# Patient Record
Sex: FEMALE | Race: WHITE | Employment: FULL TIME | URBAN - METROPOLITAN AREA
[De-identification: names, ages, dates, MRNs, and addresses within clinical notes are randomized per-mention and may not be internally consistent; named-entity substitution may affect disease eponyms.]

---

## 2020-06-26 ENCOUNTER — HOSPITAL ENCOUNTER (INPATIENT)
Age: 45
LOS: 3 days | Discharge: HOME | DRG: 494 | End: 2020-06-29
Attending: EMERGENCY MEDICINE | Admitting: INTERNAL MEDICINE
Payer: COMMERCIAL

## 2020-06-26 ENCOUNTER — APPOINTMENT (OUTPATIENT)
Dept: GENERAL RADIOLOGY | Age: 45
DRG: 494 | End: 2020-06-26
Payer: COMMERCIAL

## 2020-06-26 PROBLEM — S82.892A CLOSED LEFT ANKLE FRACTURE, INITIAL ENCOUNTER: Status: ACTIVE | Noted: 2020-06-26

## 2020-06-26 LAB
ANION GAP SERPL CALCULATED.3IONS-SCNC: 15 MMOL/L (ref 3–16)
BASOPHILS ABSOLUTE: 0 K/UL (ref 0–0.2)
BASOPHILS RELATIVE PERCENT: 0.4 %
BUN BLDV-MCNC: 11 MG/DL (ref 7–20)
CALCIUM SERPL-MCNC: 10.2 MG/DL (ref 8.3–10.6)
CHLORIDE BLD-SCNC: 101 MMOL/L (ref 99–110)
CO2: 20 MMOL/L (ref 21–32)
CREAT SERPL-MCNC: 0.7 MG/DL (ref 0.6–1.1)
EOSINOPHILS ABSOLUTE: 0.1 K/UL (ref 0–0.6)
EOSINOPHILS RELATIVE PERCENT: 0.7 %
GFR AFRICAN AMERICAN: >60
GFR NON-AFRICAN AMERICAN: >60
GLUCOSE BLD-MCNC: 96 MG/DL (ref 70–99)
HCT VFR BLD CALC: 45.1 % (ref 36–48)
HEMOGLOBIN: 15.8 G/DL (ref 12–16)
INR BLD: 0.97 (ref 0.86–1.14)
LYMPHOCYTES ABSOLUTE: 1.8 K/UL (ref 1–5.1)
LYMPHOCYTES RELATIVE PERCENT: 16.8 %
MCH RBC QN AUTO: 31.7 PG (ref 26–34)
MCHC RBC AUTO-ENTMCNC: 34.9 G/DL (ref 31–36)
MCV RBC AUTO: 90.9 FL (ref 80–100)
MONOCYTES ABSOLUTE: 0.3 K/UL (ref 0–1.3)
MONOCYTES RELATIVE PERCENT: 2.9 %
NEUTROPHILS ABSOLUTE: 8.6 K/UL (ref 1.7–7.7)
NEUTROPHILS RELATIVE PERCENT: 79.2 %
PDW BLD-RTO: 13.3 % (ref 12.4–15.4)
PLATELET # BLD: 287 K/UL (ref 135–450)
PMV BLD AUTO: 7.9 FL (ref 5–10.5)
POTASSIUM REFLEX MAGNESIUM: 4.1 MMOL/L (ref 3.5–5.1)
PROTHROMBIN TIME: 11.2 SEC (ref 10–13.2)
RBC # BLD: 4.97 M/UL (ref 4–5.2)
SODIUM BLD-SCNC: 136 MMOL/L (ref 136–145)
WBC # BLD: 10.9 K/UL (ref 4–11)

## 2020-06-26 PROCEDURE — 99285 EMERGENCY DEPT VISIT HI MDM: CPT

## 2020-06-26 PROCEDURE — 73590 X-RAY EXAM OF LOWER LEG: CPT

## 2020-06-26 PROCEDURE — 85610 PROTHROMBIN TIME: CPT

## 2020-06-26 PROCEDURE — 6360000002 HC RX W HCPCS: Performed by: EMERGENCY MEDICINE

## 2020-06-26 PROCEDURE — 73630 X-RAY EXAM OF FOOT: CPT

## 2020-06-26 PROCEDURE — 6360000002 HC RX W HCPCS: Performed by: INTERNAL MEDICINE

## 2020-06-26 PROCEDURE — 2580000003 HC RX 258: Performed by: INTERNAL MEDICINE

## 2020-06-26 PROCEDURE — 6370000000 HC RX 637 (ALT 250 FOR IP): Performed by: INTERNAL MEDICINE

## 2020-06-26 PROCEDURE — 73610 X-RAY EXAM OF ANKLE: CPT

## 2020-06-26 PROCEDURE — 85025 COMPLETE CBC W/AUTO DIFF WBC: CPT

## 2020-06-26 PROCEDURE — 6360000002 HC RX W HCPCS: Performed by: STUDENT IN AN ORGANIZED HEALTH CARE EDUCATION/TRAINING PROGRAM

## 2020-06-26 PROCEDURE — 1200000000 HC SEMI PRIVATE

## 2020-06-26 PROCEDURE — 80048 BASIC METABOLIC PNL TOTAL CA: CPT

## 2020-06-26 RX ORDER — PROMETHAZINE HYDROCHLORIDE 25 MG/1
12.5 TABLET ORAL EVERY 6 HOURS PRN
Status: DISCONTINUED | OUTPATIENT
Start: 2020-06-26 | End: 2020-06-27

## 2020-06-26 RX ORDER — ACETAMINOPHEN 325 MG/1
650 TABLET ORAL EVERY 6 HOURS PRN
Status: DISCONTINUED | OUTPATIENT
Start: 2020-06-26 | End: 2020-06-27

## 2020-06-26 RX ORDER — IBUPROFEN 400 MG/1
800 TABLET ORAL ONCE
Status: COMPLETED | OUTPATIENT
Start: 2020-06-26 | End: 2020-06-26

## 2020-06-26 RX ORDER — FAMOTIDINE 20 MG/1
20 TABLET, FILM COATED ORAL 2 TIMES DAILY
Status: DISCONTINUED | OUTPATIENT
Start: 2020-06-26 | End: 2020-06-27

## 2020-06-26 RX ORDER — POTASSIUM CHLORIDE 7.45 MG/ML
10 INJECTION INTRAVENOUS PRN
Status: DISCONTINUED | OUTPATIENT
Start: 2020-06-26 | End: 2020-06-27

## 2020-06-26 RX ORDER — SENNA AND DOCUSATE SODIUM 50; 8.6 MG/1; MG/1
1 TABLET, FILM COATED ORAL 2 TIMES DAILY
Status: DISCONTINUED | OUTPATIENT
Start: 2020-06-26 | End: 2020-06-27

## 2020-06-26 RX ORDER — OXYCODONE HYDROCHLORIDE AND ACETAMINOPHEN 5; 325 MG/1; MG/1
2 TABLET ORAL ONCE
Status: DISCONTINUED | OUTPATIENT
Start: 2020-06-26 | End: 2020-06-26

## 2020-06-26 RX ORDER — FENTANYL CITRATE 50 UG/ML
50 INJECTION, SOLUTION INTRAMUSCULAR; INTRAVENOUS ONCE
Status: DISCONTINUED | OUTPATIENT
Start: 2020-06-26 | End: 2020-06-27

## 2020-06-26 RX ORDER — ONDANSETRON 2 MG/ML
4 INJECTION INTRAMUSCULAR; INTRAVENOUS EVERY 6 HOURS PRN
Status: DISCONTINUED | OUTPATIENT
Start: 2020-06-26 | End: 2020-06-27

## 2020-06-26 RX ORDER — ACETAMINOPHEN 650 MG/1
650 SUPPOSITORY RECTAL EVERY 6 HOURS PRN
Status: DISCONTINUED | OUTPATIENT
Start: 2020-06-26 | End: 2020-06-27

## 2020-06-26 RX ORDER — MAGNESIUM SULFATE 1 G/100ML
1 INJECTION INTRAVENOUS PRN
Status: DISCONTINUED | OUTPATIENT
Start: 2020-06-26 | End: 2020-06-27

## 2020-06-26 RX ORDER — SODIUM CHLORIDE 9 MG/ML
INJECTION, SOLUTION INTRAVENOUS CONTINUOUS
Status: DISCONTINUED | OUTPATIENT
Start: 2020-06-26 | End: 2020-06-27

## 2020-06-26 RX ORDER — SODIUM CHLORIDE 0.9 % (FLUSH) 0.9 %
10 SYRINGE (ML) INJECTION PRN
Status: DISCONTINUED | OUTPATIENT
Start: 2020-06-26 | End: 2020-06-27

## 2020-06-26 RX ORDER — SODIUM CHLORIDE 0.9 % (FLUSH) 0.9 %
10 SYRINGE (ML) INJECTION EVERY 12 HOURS SCHEDULED
Status: DISCONTINUED | OUTPATIENT
Start: 2020-06-26 | End: 2020-06-27

## 2020-06-26 RX ADMIN — IBUPROFEN 600 MG: 200 TABLET, FILM COATED ORAL at 22:55

## 2020-06-26 RX ADMIN — SODIUM CHLORIDE: 9 INJECTION, SOLUTION INTRAVENOUS at 23:02

## 2020-06-26 RX ADMIN — FAMOTIDINE 20 MG: 20 TABLET ORAL at 22:54

## 2020-06-26 RX ADMIN — HYDROMORPHONE HYDROCHLORIDE 0.5 MG: 1 INJECTION, SOLUTION INTRAMUSCULAR; INTRAVENOUS; SUBCUTANEOUS at 20:04

## 2020-06-26 RX ADMIN — HYDROMORPHONE HYDROCHLORIDE 0.5 MG: 1 INJECTION, SOLUTION INTRAMUSCULAR; INTRAVENOUS; SUBCUTANEOUS at 23:02

## 2020-06-26 RX ADMIN — ENOXAPARIN SODIUM 40 MG: 40 INJECTION SUBCUTANEOUS at 22:53

## 2020-06-26 RX ADMIN — IBUPROFEN 800 MG: 400 TABLET ORAL at 17:35

## 2020-06-26 ASSESSMENT — PAIN SCALES - GENERAL
PAINLEVEL_OUTOF10: 8
PAINLEVEL_OUTOF10: 5
PAINLEVEL_OUTOF10: 10
PAINLEVEL_OUTOF10: 7
PAINLEVEL_OUTOF10: 10
PAINLEVEL_OUTOF10: 8

## 2020-06-26 ASSESSMENT — PAIN DESCRIPTION - PAIN TYPE
TYPE: ACUTE PAIN
TYPE: ACUTE PAIN

## 2020-06-26 ASSESSMENT — PAIN - FUNCTIONAL ASSESSMENT: PAIN_FUNCTIONAL_ASSESSMENT: PREVENTS OR INTERFERES SOME ACTIVE ACTIVITIES AND ADLS

## 2020-06-26 ASSESSMENT — PAIN DESCRIPTION - DESCRIPTORS
DESCRIPTORS: ACHING
DESCRIPTORS: THROBBING

## 2020-06-26 ASSESSMENT — PAIN DESCRIPTION - FREQUENCY
FREQUENCY: CONTINUOUS
FREQUENCY: INTERMITTENT

## 2020-06-26 ASSESSMENT — PAIN DESCRIPTION - PROGRESSION: CLINICAL_PROGRESSION: NOT CHANGED

## 2020-06-26 ASSESSMENT — PAIN DESCRIPTION - LOCATION
LOCATION: FOOT;ANKLE
LOCATION: ANKLE;FOOT

## 2020-06-26 ASSESSMENT — PAIN DESCRIPTION - ORIENTATION
ORIENTATION: LEFT
ORIENTATION: LEFT

## 2020-06-26 ASSESSMENT — PAIN DESCRIPTION - ONSET
ONSET: ON-GOING
ONSET: ON-GOING

## 2020-06-26 NOTE — ED PROVIDER NOTES
abnormalities to the bilateral upper extremities, or right lower extremity, the patient does have a grossly swollen left ankle with ecchymosis on the lateral aspect, she is neurovascularly intact with good dorsalis pedis pulse, is able to wiggle toes, plantarflex and dorsiflex with pain   Skin:     General: Skin is warm and dry. Neurological:      Mental Status: She is alert and oriented to person, place, and time. Psychiatric:         Mood and Affect: Mood normal.         Behavior: Behavior normal.         DiagnosticResults     EKG   Interpreted in conjunction with emergencydepartment physician Mic Mccall MD    None    RADIOLOGY:  XR TIBIA FIBULA LEFT (2 VIEWS)   Final Result   Impression:    Unstable ankle with medial subluxation of the tibia, disruption of the ankle mortise, and oblique distal fibular shaft fracture. XR FOOT LEFT (MIN 3 VIEWS)   Final Result   Impression:    No additional fracture in the foot. XR ANKLE LEFT (MIN 3 VIEWS)   Final Result   Impression:    Unstable ankle with medial subluxation of the tibia, disruption of the ankle mortise, and oblique distal fibular shaft fracture. LABS:   No results found for this visit on 06/26/20. ED BEDSIDE ULTRASOUND:  None    RECENT VITALS:  BP: 132/85, Temp: 98.6 °F (37 °C), Pulse: 79,Resp: 16, SpO2: 99 %     Procedures     Procedures    ED Course     Nursing Notes, Past Medical Hx, Past Surgical Hx, Social Hx, Allergies, and Family Hx were reviewed.     The patient was given the followingmedications:  Orders Placed This Encounter   Medications    ibuprofen (ADVIL;MOTRIN) tablet 800 mg       CONSULTS:  Dandre Abdalla / ASSESSMENT / Connie Castanon is a 38 y.o. female mechanical fall today in the grass, immediate onset of left ankle pain with a pop, followed by significant swelling, on exam she is neurovascular intact, though has significant

## 2020-06-27 ENCOUNTER — ANESTHESIA EVENT (OUTPATIENT)
Dept: OPERATING ROOM | Age: 45
DRG: 494 | End: 2020-06-27
Payer: COMMERCIAL

## 2020-06-27 ENCOUNTER — ANESTHESIA (OUTPATIENT)
Dept: OPERATING ROOM | Age: 45
DRG: 494 | End: 2020-06-27
Payer: COMMERCIAL

## 2020-06-27 LAB
ANION GAP SERPL CALCULATED.3IONS-SCNC: 10 MMOL/L (ref 3–16)
BUN BLDV-MCNC: 13 MG/DL (ref 7–20)
CALCIUM SERPL-MCNC: 8.6 MG/DL (ref 8.3–10.6)
CHLORIDE BLD-SCNC: 107 MMOL/L (ref 99–110)
CO2: 19 MMOL/L (ref 21–32)
CREAT SERPL-MCNC: 0.7 MG/DL (ref 0.6–1.1)
GFR AFRICAN AMERICAN: >60
GFR NON-AFRICAN AMERICAN: >60
GLUCOSE BLD-MCNC: 111 MG/DL (ref 70–99)
HCG(URINE) PREGNANCY TEST: NEGATIVE
HCT VFR BLD CALC: 37.2 % (ref 36–48)
HEMOGLOBIN: 12.8 G/DL (ref 12–16)
MCH RBC QN AUTO: 31.9 PG (ref 26–34)
MCHC RBC AUTO-ENTMCNC: 34.4 G/DL (ref 31–36)
MCV RBC AUTO: 92.7 FL (ref 80–100)
PDW BLD-RTO: 13.5 % (ref 12.4–15.4)
PLATELET # BLD: 214 K/UL (ref 135–450)
PMV BLD AUTO: 7.8 FL (ref 5–10.5)
POTASSIUM REFLEX MAGNESIUM: 3.8 MMOL/L (ref 3.5–5.1)
RBC # BLD: 4.01 M/UL (ref 4–5.2)
SARS-COV-2, NAAT: NOT DETECTED
SODIUM BLD-SCNC: 136 MMOL/L (ref 136–145)
WBC # BLD: 6.5 K/UL (ref 4–11)

## 2020-06-27 PROCEDURE — 2580000003 HC RX 258: Performed by: INTERNAL MEDICINE

## 2020-06-27 PROCEDURE — 36415 COLL VENOUS BLD VENIPUNCTURE: CPT

## 2020-06-27 PROCEDURE — 6370000000 HC RX 637 (ALT 250 FOR IP): Performed by: INTERNAL MEDICINE

## 2020-06-27 PROCEDURE — 84703 CHORIONIC GONADOTROPIN ASSAY: CPT

## 2020-06-27 PROCEDURE — U0002 COVID-19 LAB TEST NON-CDC: HCPCS

## 2020-06-27 PROCEDURE — 85027 COMPLETE CBC AUTOMATED: CPT

## 2020-06-27 PROCEDURE — 1200000000 HC SEMI PRIVATE

## 2020-06-27 PROCEDURE — 2580000003 HC RX 258: Performed by: ORTHOPAEDIC SURGERY

## 2020-06-27 PROCEDURE — 6370000000 HC RX 637 (ALT 250 FOR IP): Performed by: ORTHOPAEDIC SURGERY

## 2020-06-27 PROCEDURE — 80048 BASIC METABOLIC PNL TOTAL CA: CPT

## 2020-06-27 PROCEDURE — 94150 VITAL CAPACITY TEST: CPT

## 2020-06-27 RX ORDER — OXYCODONE HYDROCHLORIDE 5 MG/1
5 TABLET ORAL EVERY 4 HOURS PRN
Status: CANCELLED | OUTPATIENT
Start: 2020-06-27 | End: 2020-06-27

## 2020-06-27 RX ORDER — UREA 10 %
3 LOTION (ML) TOPICAL NIGHTLY PRN
Status: DISCONTINUED | OUTPATIENT
Start: 2020-06-27 | End: 2020-06-29 | Stop reason: HOSPADM

## 2020-06-27 RX ORDER — ACETAMINOPHEN 650 MG/1
650 SUPPOSITORY RECTAL EVERY 6 HOURS PRN
Status: DISCONTINUED | OUTPATIENT
Start: 2020-06-27 | End: 2020-06-29 | Stop reason: HOSPADM

## 2020-06-27 RX ORDER — ONDANSETRON 2 MG/ML
4 INJECTION INTRAMUSCULAR; INTRAVENOUS EVERY 6 HOURS PRN
Status: DISCONTINUED | OUTPATIENT
Start: 2020-06-27 | End: 2020-06-29 | Stop reason: HOSPADM

## 2020-06-27 RX ORDER — SODIUM CHLORIDE 0.9 % (FLUSH) 0.9 %
10 SYRINGE (ML) INJECTION EVERY 12 HOURS SCHEDULED
Status: DISCONTINUED | OUTPATIENT
Start: 2020-06-27 | End: 2020-06-29 | Stop reason: HOSPADM

## 2020-06-27 RX ORDER — PROMETHAZINE HYDROCHLORIDE 12.5 MG/1
12.5 TABLET ORAL EVERY 6 HOURS PRN
Status: DISCONTINUED | OUTPATIENT
Start: 2020-06-27 | End: 2020-06-29 | Stop reason: HOSPADM

## 2020-06-27 RX ORDER — OXYCODONE HYDROCHLORIDE 5 MG/1
10 TABLET ORAL EVERY 4 HOURS PRN
Status: CANCELLED | OUTPATIENT
Start: 2020-06-27 | End: 2020-06-27

## 2020-06-27 RX ORDER — OXYCODONE HYDROCHLORIDE 5 MG/1
5 TABLET ORAL EVERY 4 HOURS PRN
Status: DISCONTINUED | OUTPATIENT
Start: 2020-06-27 | End: 2020-06-27

## 2020-06-27 RX ORDER — SODIUM CHLORIDE 0.9 % (FLUSH) 0.9 %
10 SYRINGE (ML) INJECTION PRN
Status: DISCONTINUED | OUTPATIENT
Start: 2020-06-27 | End: 2020-06-29 | Stop reason: HOSPADM

## 2020-06-27 RX ORDER — IBUPROFEN 600 MG/1
600 TABLET ORAL EVERY 6 HOURS
Status: DISPENSED | OUTPATIENT
Start: 2020-06-27 | End: 2020-06-28

## 2020-06-27 RX ORDER — ACETAMINOPHEN 325 MG/1
650 TABLET ORAL EVERY 6 HOURS PRN
Status: DISCONTINUED | OUTPATIENT
Start: 2020-06-27 | End: 2020-06-29 | Stop reason: HOSPADM

## 2020-06-27 RX ORDER — POTASSIUM CHLORIDE 7.45 MG/ML
10 INJECTION INTRAVENOUS PRN
Status: ACTIVE | OUTPATIENT
Start: 2020-06-27 | End: 2020-06-28

## 2020-06-27 RX ORDER — 0.9 % SODIUM CHLORIDE 0.9 %
500 INTRAVENOUS SOLUTION INTRAVENOUS ONCE
Status: DISCONTINUED | OUTPATIENT
Start: 2020-06-27 | End: 2020-06-27

## 2020-06-27 RX ORDER — SODIUM CHLORIDE 9 MG/ML
INJECTION, SOLUTION INTRAVENOUS CONTINUOUS
Status: DISCONTINUED | OUTPATIENT
Start: 2020-06-28 | End: 2020-06-29 | Stop reason: HOSPADM

## 2020-06-27 RX ORDER — OXYCODONE HYDROCHLORIDE 5 MG/1
10 TABLET ORAL EVERY 4 HOURS PRN
Status: DISCONTINUED | OUTPATIENT
Start: 2020-06-27 | End: 2020-06-27

## 2020-06-27 RX ORDER — MAGNESIUM SULFATE 1 G/100ML
1 INJECTION INTRAVENOUS PRN
Status: ACTIVE | OUTPATIENT
Start: 2020-06-27 | End: 2020-06-28

## 2020-06-27 RX ORDER — ACETAMINOPHEN 325 MG/1
650 TABLET ORAL EVERY 6 HOURS
Status: DISCONTINUED | OUTPATIENT
Start: 2020-06-27 | End: 2020-06-27

## 2020-06-27 RX ORDER — ACETAMINOPHEN 325 MG/1
650 TABLET ORAL EVERY 6 HOURS
Status: CANCELLED | OUTPATIENT
Start: 2020-06-28

## 2020-06-27 RX ORDER — SENNA AND DOCUSATE SODIUM 50; 8.6 MG/1; MG/1
1 TABLET, FILM COATED ORAL 2 TIMES DAILY
Status: DISCONTINUED | OUTPATIENT
Start: 2020-06-27 | End: 2020-06-29 | Stop reason: HOSPADM

## 2020-06-27 RX ADMIN — IBUPROFEN 600 MG: 600 TABLET, FILM COATED ORAL at 22:11

## 2020-06-27 RX ADMIN — Medication 3 MG: at 23:53

## 2020-06-27 RX ADMIN — Medication 10 ML: at 22:11

## 2020-06-27 RX ADMIN — SODIUM CHLORIDE: 9 INJECTION, SOLUTION INTRAVENOUS at 08:44

## 2020-06-27 RX ADMIN — DOCUSATE SODIUM 50 MG AND SENNOSIDES 8.6 MG 1 TABLET: 8.6; 5 TABLET, FILM COATED ORAL at 22:11

## 2020-06-27 RX ADMIN — IBUPROFEN 600 MG: 200 TABLET, FILM COATED ORAL at 16:54

## 2020-06-27 RX ADMIN — SODIUM CHLORIDE: 9 INJECTION, SOLUTION INTRAVENOUS at 23:53

## 2020-06-27 RX ADMIN — FAMOTIDINE 20 MG: 20 TABLET ORAL at 08:33

## 2020-06-27 RX ADMIN — IBUPROFEN 600 MG: 200 TABLET, FILM COATED ORAL at 07:39

## 2020-06-27 ASSESSMENT — PAIN DESCRIPTION - ORIENTATION
ORIENTATION: LEFT

## 2020-06-27 ASSESSMENT — PAIN SCALES - GENERAL
PAINLEVEL_OUTOF10: 7
PAINLEVEL_OUTOF10: 0
PAINLEVEL_OUTOF10: 0
PAINLEVEL_OUTOF10: 5
PAINLEVEL_OUTOF10: 4

## 2020-06-27 ASSESSMENT — PAIN DESCRIPTION - FREQUENCY
FREQUENCY: CONTINUOUS
FREQUENCY: CONTINUOUS

## 2020-06-27 ASSESSMENT — PAIN DESCRIPTION - LOCATION
LOCATION: ANKLE
LOCATION: FOOT
LOCATION: FOOT

## 2020-06-27 ASSESSMENT — PAIN DESCRIPTION - ONSET
ONSET: ON-GOING
ONSET: ON-GOING

## 2020-06-27 ASSESSMENT — PAIN DESCRIPTION - DESCRIPTORS
DESCRIPTORS: ACHING
DESCRIPTORS: ACHING

## 2020-06-27 ASSESSMENT — PAIN - FUNCTIONAL ASSESSMENT
PAIN_FUNCTIONAL_ASSESSMENT: PREVENTS OR INTERFERES SOME ACTIVE ACTIVITIES AND ADLS
PAIN_FUNCTIONAL_ASSESSMENT: PREVENTS OR INTERFERES SOME ACTIVE ACTIVITIES AND ADLS

## 2020-06-27 ASSESSMENT — PAIN DESCRIPTION - PAIN TYPE
TYPE: ACUTE PAIN

## 2020-06-27 ASSESSMENT — PAIN DESCRIPTION - PROGRESSION
CLINICAL_PROGRESSION: NOT CHANGED
CLINICAL_PROGRESSION: NOT CHANGED

## 2020-06-27 NOTE — PROGRESS NOTES
4 Eyes Admission Assessment     I agree as the admission nurse that 2 RN's have performed a thorough Head to Toe Skin Assessment on the patient. ALL assessment sites listed below have been assessed on admission. Areas assessed by both nurses:   [x]   Head, Face, and Ears   [x]   Shoulders, Back, and Chest  [x]   Arms, Elbows, and Hands   [x]   Coccyx, Sacrum, and Ischum  [x]   Legs, Feet, and Heels        Does the Patient have Skin Breakdown?   No redness to bilateral knees and a scratch to upper right back         Aung Prevention initiated:  Yes   Wound Care Orders initiated:  No      WOC nurse consulted for Pressure Injury (Stage 3,4, Unstageable, DTI, NWPT, and Complex wounds):  No      Nurse 1 eSignature: Electronically signed by Rita Seals RN on 6/27/20 at 1:15 AM EDT    **SHARE this note so that the co-signing nurse is able to place an eSignature**    Nurse 2 eSignature: Electronically signed by Claudine Howard RN on 6/27/20 at 4:02 AM EDT

## 2020-06-27 NOTE — PLAN OF CARE
Problem: Falls - Risk of:  Goal: Will remain free from falls  Outcome: Ongoing   Pt remains free from injury during this shift. Pt is up with assistance x 1 person with front wheeled walker. Encourage pt to call for all assistance. Call light is in reach, bed alarm is activated for safety, bed locked and in lowest position. Will continue to monitor. Will continue to monitor. Problem: PAIN  Goal: Patient's pain/discomfort is manageable  Outcome: Ongoing   Medicated pt per orders, please see e-Mar. Encourage pt to call if pain increases. Will continue to monitor.

## 2020-06-27 NOTE — CONSULTS
chloride infusion, , Intravenous, Continuous  Allergies:  Patient has no known allergies. Social History:   , lives independently  Family History:   No family history on file. REVIEW OF SYSTEMS:    No systemic c/o  All other systems acutely negative except as per HPI    PHYSICAL EXAM:    VITALS:  /87   Pulse 85   Temp 98.1 °F (36.7 °C) (Oral)   Resp 16   Ht 5' 7\" (1.702 m)   Wt 185 lb (83.9 kg)   SpO2 96%   BMI 28.98 kg/m²   CONSTITUTIONAL:  awake, alert, cooperative, no apparent distress, and appears stated age  EYES:  Lids and lashes normal, pupils equal, round and reactive to light, extra ocular muscles intact, sclera clear, conjunctiva normal  ENT:  Normocephalic, without obvious abnormality, atraumatic  NECK:  Supple, symmetrical, trachea midline, no adenopathy  HEMATOLOGIC/LYMPHATICS: no distal LE adenopathy  BACK:  Symmetric, no curvature, spinous processes are non-tender on palpation  LUNGS:  No increased work of breathing, good air exchange,  MUSCULOSKELETAL:  -log roll B LE. No knee tenderness. L LE in splint. Able to df/pf toes. Good ROM B UE without pain. LT intact. Motor strength is 5 out of 5 all extremities bilaterally. Tone is normal.  NEUROLOGIC:  Awake, alert, oriented to name, place and time.     Gait: unable  Coordination: intact  SKIN:  no bruising or bleeding    DATA:    CBC:   Lab Results   Component Value Date    WBC 6.5 06/27/2020    RBC 4.01 06/27/2020    HGB 12.8 06/27/2020    HCT 37.2 06/27/2020    MCV 92.7 06/27/2020    MCH 31.9 06/27/2020    MCHC 34.4 06/27/2020    RDW 13.5 06/27/2020     06/27/2020    MPV 7.8 06/27/2020     CMP:    Lab Results   Component Value Date     06/27/2020    K 3.8 06/27/2020     06/27/2020    CO2 19 06/27/2020    BUN 13 06/27/2020    CREATININE 0.7 06/27/2020    GFRAA >60 06/27/2020    LABGLOM >60 06/27/2020    GLUCOSE 111 06/27/2020    CALCIUM 8.6 06/27/2020     PT/INR:    Lab Results   Component Value Date PROTIME 11.2 06/26/2020    INR 0.97 06/26/2020     PTT:  No results found for: APTT, PTT[APTT}    IMPRESSION/RECOMMENDATIONS:    L ankle fracture dislocation with Aj C lateral malleolus fracture, syndesmosis disruption    Risks,complications, benefits of treatment discussed  All questions answered  Will plan for ORIF   Abx OCTOR

## 2020-06-27 NOTE — PROGRESS NOTES
Normal skin color, texture, turgor. No rashes or lesions noted. Psychiatric: Alert and oriented x4, good insight and judgment    Labs:   Recent Labs     06/26/20 2010 06/27/20  0513   WBC 10.9 6.5   HGB 15.8 12.8   HCT 45.1 37.2    214     Recent Labs     06/26/20 2010 06/27/20  0513    136   K 4.1 3.8    107   CO2 20* 19*   BUN 11 13   CREATININE 0.7 0.7   CALCIUM 10.2 8.6     No results for input(s): AST, ALT, BILIDIR, BILITOT, ALKPHOS in the last 72 hours. Recent Labs     06/26/20 2011   INR 0.97     No results for input(s): Thena Gourd in the last 72 hours. Urinalysis:    No results found for: Senia Gonzalez, BACTERIA, RBCUA, BLOODU, SPECGRAV, Any [place] [address]    Radiology:  XR TIBIA FIBULA LEFT (2 VIEWS)   Final Result   Impression:    Unstable ankle with medial subluxation of the tibia, disruption of the ankle mortise, and oblique distal fibular shaft fracture. XR FOOT LEFT (MIN 3 VIEWS)   Final Result   Impression:    No additional fracture in the foot. XR ANKLE LEFT (MIN 3 VIEWS)   Final Result   Impression:    Unstable ankle with medial subluxation of the tibia, disruption of the ankle mortise, and oblique distal fibular shaft fracture.           Assessment/Plan:    Active Hospital Problems    Diagnosis Date Noted    Closed left ankle fracture, initial encounter [S82.892A] 06/26/2020       Plan:    # L ankle fracture, traumatic  -Splinted in ED  -Ortho consulted, plan for OR in AM  -Analgesia as needed  -Preop labs ordered and pending  -Medical history reviewed, unremarkable  -NPO at midnight, IV maintenance fluids    DVT Prophylaxis: Lovenox  Diet: Diet NPO, After Midnight  Code Status: Full Code    PT/OT Eval Status: deferred    Leslie Contreras MD

## 2020-06-27 NOTE — PROGRESS NOTES
ORTHO   Full note to follow  L ankle fracture dislocation with Aj C distal fibula and syndesmosis injury  Plan for ORIF later today

## 2020-06-27 NOTE — PLAN OF CARE
Problem: Falls - Risk of:  Goal: Will remain free from falls  Description: Will remain free from falls  6/27/2020 1940 by William Bell RN  Outcome: Ongoing   Pt is a high fall risk. High fall precautions are in place: nonskid socks, yellow blanket, fall wristband, call light in reach, bed is locked and in lowest position, bed alarm engaged. Problem: PAIN  Goal: Patient's pain/discomfort is manageable  6/27/2020 1940 by William Bell RN  Outcome: Ongoing  Pt denies pain. Will continue to assess pain on 0-10 scale for appropriate pain management.

## 2020-06-27 NOTE — PLAN OF CARE
Problem: Falls - Risk of:  Goal: Will remain free from falls  Description: Fall precautions in place. Bed is in lowest position, wheels locked, alarm on, non-skid socks on. Call light and bedside table within reach. Patient calls out appropriately. Patient is up x1 assist with walker NWB on LLE. Will continue to assess and monitor. 6/27/2020 1253 by Whit Veronica RN  Outcome: Ongoing       Problem: PAIN  Goal: Patient's pain/discomfort is manageable. Pt reports no pain at rest. Resting comfortably, mother at bedside.   6/27/2020 1253 by Whit Veronica RN  Outcome: Ongoing

## 2020-06-28 ENCOUNTER — APPOINTMENT (OUTPATIENT)
Dept: GENERAL RADIOLOGY | Age: 45
DRG: 494 | End: 2020-06-28
Payer: COMMERCIAL

## 2020-06-28 VITALS — TEMPERATURE: 97.5 F | DIASTOLIC BLOOD PRESSURE: 87 MMHG | SYSTOLIC BLOOD PRESSURE: 139 MMHG | OXYGEN SATURATION: 100 %

## 2020-06-28 LAB
ANION GAP SERPL CALCULATED.3IONS-SCNC: 10 MMOL/L (ref 3–16)
BUN BLDV-MCNC: 7 MG/DL (ref 7–20)
CALCIUM SERPL-MCNC: 8.9 MG/DL (ref 8.3–10.6)
CHLORIDE BLD-SCNC: 109 MMOL/L (ref 99–110)
CO2: 21 MMOL/L (ref 21–32)
CREAT SERPL-MCNC: 0.6 MG/DL (ref 0.6–1.1)
GFR AFRICAN AMERICAN: >60
GFR NON-AFRICAN AMERICAN: >60
GLUCOSE BLD-MCNC: 101 MG/DL (ref 70–99)
HCT VFR BLD CALC: 38.9 % (ref 36–48)
HEMOGLOBIN: 13.2 G/DL (ref 12–16)
MCH RBC QN AUTO: 31.4 PG (ref 26–34)
MCHC RBC AUTO-ENTMCNC: 34 G/DL (ref 31–36)
MCV RBC AUTO: 92.3 FL (ref 80–100)
PDW BLD-RTO: 13.6 % (ref 12.4–15.4)
PLATELET # BLD: 202 K/UL (ref 135–450)
PMV BLD AUTO: 7.5 FL (ref 5–10.5)
POTASSIUM REFLEX MAGNESIUM: 4.1 MMOL/L (ref 3.5–5.1)
RBC # BLD: 4.21 M/UL (ref 4–5.2)
SODIUM BLD-SCNC: 140 MMOL/L (ref 136–145)
WBC # BLD: 5.2 K/UL (ref 4–11)

## 2020-06-28 PROCEDURE — 3209999900 FLUORO FOR SURGICAL PROCEDURES

## 2020-06-28 PROCEDURE — 6360000002 HC RX W HCPCS: Performed by: ORTHOPAEDIC SURGERY

## 2020-06-28 PROCEDURE — 2580000003 HC RX 258: Performed by: ORTHOPAEDIC SURGERY

## 2020-06-28 PROCEDURE — 0QSK04Z REPOSITION LEFT FIBULA WITH INTERNAL FIXATION DEVICE, OPEN APPROACH: ICD-10-PCS | Performed by: ORTHOPAEDIC SURGERY

## 2020-06-28 PROCEDURE — 6370000000 HC RX 637 (ALT 250 FOR IP): Performed by: INTERNAL MEDICINE

## 2020-06-28 PROCEDURE — 7100000001 HC PACU RECOVERY - ADDTL 15 MIN: Performed by: ORTHOPAEDIC SURGERY

## 2020-06-28 PROCEDURE — 80048 BASIC METABOLIC PNL TOTAL CA: CPT

## 2020-06-28 PROCEDURE — 85027 COMPLETE CBC AUTOMATED: CPT

## 2020-06-28 PROCEDURE — 3700000000 HC ANESTHESIA ATTENDED CARE: Performed by: ORTHOPAEDIC SURGERY

## 2020-06-28 PROCEDURE — 3600000004 HC SURGERY LEVEL 4 BASE: Performed by: ORTHOPAEDIC SURGERY

## 2020-06-28 PROCEDURE — 2580000003 HC RX 258: Performed by: INTERNAL MEDICINE

## 2020-06-28 PROCEDURE — 2500000003 HC RX 250 WO HCPCS: Performed by: ANESTHESIOLOGY

## 2020-06-28 PROCEDURE — 73600 X-RAY EXAM OF ANKLE: CPT

## 2020-06-28 PROCEDURE — 7100000000 HC PACU RECOVERY - FIRST 15 MIN: Performed by: ORTHOPAEDIC SURGERY

## 2020-06-28 PROCEDURE — C1713 ANCHOR/SCREW BN/BN,TIS/BN: HCPCS | Performed by: ORTHOPAEDIC SURGERY

## 2020-06-28 PROCEDURE — 36415 COLL VENOUS BLD VENIPUNCTURE: CPT

## 2020-06-28 PROCEDURE — 2500000003 HC RX 250 WO HCPCS: Performed by: ORTHOPAEDIC SURGERY

## 2020-06-28 PROCEDURE — 6370000000 HC RX 637 (ALT 250 FOR IP): Performed by: ORTHOPAEDIC SURGERY

## 2020-06-28 PROCEDURE — 1200000000 HC SEMI PRIVATE

## 2020-06-28 PROCEDURE — 6360000002 HC RX W HCPCS: Performed by: ANESTHESIOLOGY

## 2020-06-28 PROCEDURE — 2709999900 HC NON-CHARGEABLE SUPPLY: Performed by: ORTHOPAEDIC SURGERY

## 2020-06-28 PROCEDURE — 3600000014 HC SURGERY LEVEL 4 ADDTL 15MIN: Performed by: ORTHOPAEDIC SURGERY

## 2020-06-28 PROCEDURE — 3700000001 HC ADD 15 MINUTES (ANESTHESIA): Performed by: ORTHOPAEDIC SURGERY

## 2020-06-28 DEVICE — SYSTEM IMPL S STL KNOTLESS SYNDESMOSIS TIGHTROPE XP: Type: IMPLANTABLE DEVICE | Site: ANKLE | Status: FUNCTIONAL

## 2020-06-28 RX ORDER — FENTANYL CITRATE 50 UG/ML
50 INJECTION, SOLUTION INTRAMUSCULAR; INTRAVENOUS EVERY 5 MIN PRN
Status: DISCONTINUED | OUTPATIENT
Start: 2020-06-28 | End: 2020-06-28 | Stop reason: HOSPADM

## 2020-06-28 RX ORDER — OXYCODONE HYDROCHLORIDE AND ACETAMINOPHEN 5; 325 MG/1; MG/1
1 TABLET ORAL PRN
Status: DISCONTINUED | OUTPATIENT
Start: 2020-06-28 | End: 2020-06-28 | Stop reason: HOSPADM

## 2020-06-28 RX ORDER — MAGNESIUM HYDROXIDE 1200 MG/15ML
LIQUID ORAL CONTINUOUS PRN
Status: COMPLETED | OUTPATIENT
Start: 2020-06-28 | End: 2020-06-28

## 2020-06-28 RX ORDER — LABETALOL 20 MG/4 ML (5 MG/ML) INTRAVENOUS SYRINGE
5 EVERY 10 MIN PRN
Status: DISCONTINUED | OUTPATIENT
Start: 2020-06-28 | End: 2020-06-28 | Stop reason: HOSPADM

## 2020-06-28 RX ORDER — LIDOCAINE HYDROCHLORIDE 20 MG/ML
INJECTION, SOLUTION INTRAVENOUS PRN
Status: DISCONTINUED | OUTPATIENT
Start: 2020-06-28 | End: 2020-06-28 | Stop reason: SDUPTHER

## 2020-06-28 RX ORDER — HYDRALAZINE HYDROCHLORIDE 20 MG/ML
5 INJECTION INTRAMUSCULAR; INTRAVENOUS EVERY 10 MIN PRN
Status: DISCONTINUED | OUTPATIENT
Start: 2020-06-28 | End: 2020-06-28 | Stop reason: HOSPADM

## 2020-06-28 RX ORDER — OXYCODONE HYDROCHLORIDE 5 MG/1
5 TABLET ORAL EVERY 4 HOURS PRN
Status: DISCONTINUED | OUTPATIENT
Start: 2020-06-28 | End: 2020-06-29 | Stop reason: HOSPADM

## 2020-06-28 RX ORDER — DEXAMETHASONE SODIUM PHOSPHATE 4 MG/ML
INJECTION, SOLUTION INTRA-ARTICULAR; INTRALESIONAL; INTRAMUSCULAR; INTRAVENOUS; SOFT TISSUE PRN
Status: DISCONTINUED | OUTPATIENT
Start: 2020-06-28 | End: 2020-06-28 | Stop reason: SDUPTHER

## 2020-06-28 RX ORDER — MIDAZOLAM HYDROCHLORIDE 1 MG/ML
INJECTION INTRAMUSCULAR; INTRAVENOUS PRN
Status: DISCONTINUED | OUTPATIENT
Start: 2020-06-28 | End: 2020-06-28 | Stop reason: SDUPTHER

## 2020-06-28 RX ORDER — ACETAMINOPHEN 325 MG/1
650 TABLET ORAL EVERY 6 HOURS
Status: DISCONTINUED | OUTPATIENT
Start: 2020-06-28 | End: 2020-06-29 | Stop reason: HOSPADM

## 2020-06-28 RX ORDER — CEFAZOLIN SODIUM 2 G/50ML
2 SOLUTION INTRAVENOUS EVERY 8 HOURS
Status: DISCONTINUED | OUTPATIENT
Start: 2020-06-28 | End: 2020-06-28 | Stop reason: SDUPTHER

## 2020-06-28 RX ORDER — FENTANYL CITRATE 50 UG/ML
INJECTION, SOLUTION INTRAMUSCULAR; INTRAVENOUS PRN
Status: DISCONTINUED | OUTPATIENT
Start: 2020-06-28 | End: 2020-06-28 | Stop reason: SDUPTHER

## 2020-06-28 RX ORDER — METOCLOPRAMIDE HYDROCHLORIDE 5 MG/ML
INJECTION INTRAMUSCULAR; INTRAVENOUS PRN
Status: DISCONTINUED | OUTPATIENT
Start: 2020-06-28 | End: 2020-06-28 | Stop reason: SDUPTHER

## 2020-06-28 RX ORDER — PROPOFOL 10 MG/ML
INJECTION, EMULSION INTRAVENOUS PRN
Status: DISCONTINUED | OUTPATIENT
Start: 2020-06-28 | End: 2020-06-28 | Stop reason: SDUPTHER

## 2020-06-28 RX ORDER — PROCHLORPERAZINE EDISYLATE 5 MG/ML
5 INJECTION INTRAMUSCULAR; INTRAVENOUS
Status: DISCONTINUED | OUTPATIENT
Start: 2020-06-28 | End: 2020-06-28 | Stop reason: HOSPADM

## 2020-06-28 RX ORDER — MEPERIDINE HYDROCHLORIDE 25 MG/ML
12.5 INJECTION INTRAMUSCULAR; INTRAVENOUS; SUBCUTANEOUS EVERY 5 MIN PRN
Status: DISCONTINUED | OUTPATIENT
Start: 2020-06-28 | End: 2020-06-28 | Stop reason: HOSPADM

## 2020-06-28 RX ORDER — FENTANYL CITRATE 50 UG/ML
25 INJECTION, SOLUTION INTRAMUSCULAR; INTRAVENOUS EVERY 5 MIN PRN
Status: DISCONTINUED | OUTPATIENT
Start: 2020-06-28 | End: 2020-06-28 | Stop reason: HOSPADM

## 2020-06-28 RX ORDER — CEFAZOLIN SODIUM 1 G/3ML
INJECTION, POWDER, FOR SOLUTION INTRAMUSCULAR; INTRAVENOUS PRN
Status: DISCONTINUED | OUTPATIENT
Start: 2020-06-28 | End: 2020-06-28 | Stop reason: SDUPTHER

## 2020-06-28 RX ORDER — OXYCODONE HYDROCHLORIDE 5 MG/1
10 TABLET ORAL EVERY 4 HOURS PRN
Status: DISCONTINUED | OUTPATIENT
Start: 2020-06-28 | End: 2020-06-29 | Stop reason: HOSPADM

## 2020-06-28 RX ORDER — BUPIVACAINE HYDROCHLORIDE 5 MG/ML
INJECTION, SOLUTION EPIDURAL; INTRACAUDAL PRN
Status: DISCONTINUED | OUTPATIENT
Start: 2020-06-28 | End: 2020-06-28 | Stop reason: ALTCHOICE

## 2020-06-28 RX ORDER — SUMATRIPTAN 50 MG/1
50 TABLET, FILM COATED ORAL
COMMUNITY

## 2020-06-28 RX ORDER — ONDANSETRON 2 MG/ML
INJECTION INTRAMUSCULAR; INTRAVENOUS PRN
Status: DISCONTINUED | OUTPATIENT
Start: 2020-06-28 | End: 2020-06-28 | Stop reason: SDUPTHER

## 2020-06-28 RX ORDER — OXYCODONE HYDROCHLORIDE AND ACETAMINOPHEN 5; 325 MG/1; MG/1
2 TABLET ORAL PRN
Status: DISCONTINUED | OUTPATIENT
Start: 2020-06-28 | End: 2020-06-28 | Stop reason: HOSPADM

## 2020-06-28 RX ORDER — DIPHENHYDRAMINE HYDROCHLORIDE 50 MG/ML
12.5 INJECTION INTRAMUSCULAR; INTRAVENOUS
Status: DISCONTINUED | OUTPATIENT
Start: 2020-06-28 | End: 2020-06-28 | Stop reason: HOSPADM

## 2020-06-28 RX ORDER — ONDANSETRON 2 MG/ML
4 INJECTION INTRAMUSCULAR; INTRAVENOUS
Status: DISCONTINUED | OUTPATIENT
Start: 2020-06-28 | End: 2020-06-28 | Stop reason: HOSPADM

## 2020-06-28 RX ADMIN — LIDOCAINE HYDROCHLORIDE 20 MG: 20 INJECTION, SOLUTION INTRAVENOUS at 08:50

## 2020-06-28 RX ADMIN — DOCUSATE SODIUM 50 MG AND SENNOSIDES 8.6 MG 1 TABLET: 8.6; 5 TABLET, FILM COATED ORAL at 20:48

## 2020-06-28 RX ADMIN — CEFAZOLIN SODIUM 2000 MG: 1 POWDER, FOR SOLUTION INTRAMUSCULAR; INTRAVENOUS at 08:59

## 2020-06-28 RX ADMIN — MIDAZOLAM HYDROCHLORIDE 2 MG: 2 INJECTION, SOLUTION INTRAMUSCULAR; INTRAVENOUS at 08:45

## 2020-06-28 RX ADMIN — ASPIRIN 325 MG: 325 TABLET, COATED ORAL at 20:48

## 2020-06-28 RX ADMIN — SODIUM CHLORIDE: 9 INJECTION, SOLUTION INTRAVENOUS at 11:36

## 2020-06-28 RX ADMIN — Medication 3 MG: at 23:59

## 2020-06-28 RX ADMIN — ACETAMINOPHEN 650 MG: 325 TABLET ORAL at 23:59

## 2020-06-28 RX ADMIN — DEXTROSE MONOHYDRATE 2 G: 50 INJECTION, SOLUTION INTRAVENOUS at 17:32

## 2020-06-28 RX ADMIN — FENTANYL CITRATE 50 MCG: 50 INJECTION, SOLUTION INTRAMUSCULAR; INTRAVENOUS at 10:55

## 2020-06-28 RX ADMIN — ACETAMINOPHEN 650 MG: 325 TABLET ORAL at 17:31

## 2020-06-28 RX ADMIN — DEXAMETHASONE SODIUM PHOSPHATE 4 MG: 4 INJECTION, SOLUTION INTRAMUSCULAR; INTRAVENOUS at 09:08

## 2020-06-28 RX ADMIN — FENTANYL CITRATE 50 MCG: 50 INJECTION INTRAMUSCULAR; INTRAVENOUS at 08:48

## 2020-06-28 RX ADMIN — FAMOTIDINE 20 MG: 10 INJECTION, SOLUTION INTRAVENOUS at 09:09

## 2020-06-28 RX ADMIN — FENTANYL CITRATE 25 MCG: 50 INJECTION, SOLUTION INTRAMUSCULAR; INTRAVENOUS at 11:07

## 2020-06-28 RX ADMIN — METOCLOPRAMIDE 10 MG: 5 INJECTION, SOLUTION INTRAMUSCULAR; INTRAVENOUS at 08:50

## 2020-06-28 RX ADMIN — FENTANYL CITRATE 50 MCG: 50 INJECTION INTRAMUSCULAR; INTRAVENOUS at 08:50

## 2020-06-28 RX ADMIN — Medication 10 ML: at 20:48

## 2020-06-28 RX ADMIN — PROPOFOL 200 MG: 10 INJECTION, EMULSION INTRAVENOUS at 08:50

## 2020-06-28 RX ADMIN — OXYCODONE 5 MG: 5 TABLET ORAL at 19:20

## 2020-06-28 RX ADMIN — ONDANSETRON 4 MG: 2 INJECTION INTRAMUSCULAR; INTRAVENOUS at 09:58

## 2020-06-28 RX ADMIN — ACETAMINOPHEN 650 MG: 325 TABLET ORAL at 12:59

## 2020-06-28 ASSESSMENT — PULMONARY FUNCTION TESTS
PIF_VALUE: 1
PIF_VALUE: 2
PIF_VALUE: 2
PIF_VALUE: 3
PIF_VALUE: 4
PIF_VALUE: 3
PIF_VALUE: 4
PIF_VALUE: 4
PIF_VALUE: 3
PIF_VALUE: 1
PIF_VALUE: 3
PIF_VALUE: 4
PIF_VALUE: 3
PIF_VALUE: 3
PIF_VALUE: 4
PIF_VALUE: 3
PIF_VALUE: 4
PIF_VALUE: 3
PIF_VALUE: 3
PIF_VALUE: 29
PIF_VALUE: 4
PIF_VALUE: 2
PIF_VALUE: 3
PIF_VALUE: 3
PIF_VALUE: 4
PIF_VALUE: 3
PIF_VALUE: 4
PIF_VALUE: 3
PIF_VALUE: 4
PIF_VALUE: 21
PIF_VALUE: 4
PIF_VALUE: 3
PIF_VALUE: 3
PIF_VALUE: 2
PIF_VALUE: 3
PIF_VALUE: 4
PIF_VALUE: 3
PIF_VALUE: 4
PIF_VALUE: 3
PIF_VALUE: 2
PIF_VALUE: 3
PIF_VALUE: 3
PIF_VALUE: 0
PIF_VALUE: 4
PIF_VALUE: 3
PIF_VALUE: 4
PIF_VALUE: 1
PIF_VALUE: 3
PIF_VALUE: 4
PIF_VALUE: 2
PIF_VALUE: 3
PIF_VALUE: 1
PIF_VALUE: 29
PIF_VALUE: 3
PIF_VALUE: 0
PIF_VALUE: 3
PIF_VALUE: 4
PIF_VALUE: 2
PIF_VALUE: 3
PIF_VALUE: 2
PIF_VALUE: 3
PIF_VALUE: 4
PIF_VALUE: 3
PIF_VALUE: 3
PIF_VALUE: 7
PIF_VALUE: 3
PIF_VALUE: 0
PIF_VALUE: 4
PIF_VALUE: 3
PIF_VALUE: 0
PIF_VALUE: 4
PIF_VALUE: 3
PIF_VALUE: 4
PIF_VALUE: 2
PIF_VALUE: 3
PIF_VALUE: 3
PIF_VALUE: 4
PIF_VALUE: 3
PIF_VALUE: 4
PIF_VALUE: 4
PIF_VALUE: 0
PIF_VALUE: 3
PIF_VALUE: 1
PIF_VALUE: 5
PIF_VALUE: 3

## 2020-06-28 ASSESSMENT — PAIN DESCRIPTION - DESCRIPTORS
DESCRIPTORS: DISCOMFORT

## 2020-06-28 ASSESSMENT — PAIN SCALES - GENERAL
PAINLEVEL_OUTOF10: 5
PAINLEVEL_OUTOF10: 0
PAINLEVEL_OUTOF10: 1
PAINLEVEL_OUTOF10: 2
PAINLEVEL_OUTOF10: 2
PAINLEVEL_OUTOF10: 7
PAINLEVEL_OUTOF10: 0
PAINLEVEL_OUTOF10: 2
PAINLEVEL_OUTOF10: 4

## 2020-06-28 ASSESSMENT — PAIN DESCRIPTION - FREQUENCY
FREQUENCY: CONTINUOUS

## 2020-06-28 ASSESSMENT — PAIN DESCRIPTION - ONSET
ONSET: ON-GOING

## 2020-06-28 ASSESSMENT — PAIN DESCRIPTION - LOCATION
LOCATION: ANKLE

## 2020-06-28 ASSESSMENT — PAIN - FUNCTIONAL ASSESSMENT: PAIN_FUNCTIONAL_ASSESSMENT: PREVENTS OR INTERFERES SOME ACTIVE ACTIVITIES AND ADLS

## 2020-06-28 ASSESSMENT — PAIN DESCRIPTION - ORIENTATION
ORIENTATION: LEFT

## 2020-06-28 ASSESSMENT — PAIN DESCRIPTION - PROGRESSION
CLINICAL_PROGRESSION: NOT CHANGED
CLINICAL_PROGRESSION: GRADUALLY IMPROVING
CLINICAL_PROGRESSION: NOT CHANGED

## 2020-06-28 ASSESSMENT — PAIN DESCRIPTION - PAIN TYPE
TYPE: SURGICAL PAIN

## 2020-06-28 NOTE — PROGRESS NOTES
Report called to [facility], RN receiving patient on 5 tower at 2450 with all information provided.

## 2020-06-28 NOTE — PROGRESS NOTES
Hospitalist Progress Note      PCP: No primary care provider on file. Date of Admission: 6/26/2020    Chief Complaint: fall, L ankle pain    Subjective:  Patient seen and examined at the bedside. Pt unable to be seen at this time as she is in the OR for her ankle fracture. Will attempt to see later as able. PFHS: reviewed as documented 6/26/2020, no changes    Medications:  Reviewed    Infusion Medications    sodium chloride 100 mL/hr at 06/27/20 7183     Scheduled Medications    sodium chloride flush  10 mL Intravenous 2 times per day    sennosides-docusate sodium  1 tablet Oral BID    ibuprofen  600 mg Oral Q6H    ceFAZolin  2 g Intravenous On Call to OR     PRN Meds: fentanNYL, fentanNYL, HYDROmorphone, HYDROmorphone, oxyCODONE-acetaminophen **OR** oxyCODONE-acetaminophen, diphenhydrAMINE, prochlorperazine, ondansetron, labetalol, hydrALAZINE, meperidine, sodium chloride flush, magnesium hydroxide, promethazine **OR** ondansetron, HYDROmorphone **OR** HYDROmorphone, acetaminophen **OR** acetaminophen, melatonin      Intake/Output Summary (Last 24 hours) at 6/28/2020 0908  Last data filed at 6/28/2020 0618  Gross per 24 hour   Intake 720 ml   Output 800 ml   Net -80 ml         Physical Exam Performed:    /85   Pulse 88   Temp 98.2 °F (36.8 °C) (Oral)   Resp 16   Ht 5' 7\" (1.702 m)   Wt 185 lb (83.9 kg)   SpO2 98%   BMI 28.98 kg/m²     Unable to examine at this time as she is off the floor. Labs:   Recent Labs     06/26/20 2010 06/27/20  0513 06/28/20  0605   WBC 10.9 6.5 5.2   HGB 15.8 12.8 13.2   HCT 45.1 37.2 38.9    214 202     Recent Labs     06/26/20 2010 06/27/20  0513 06/28/20  0605    136 140   K 4.1 3.8 4.1    107 109   CO2 20* 19* 21   BUN 11 13 7   CREATININE 0.7 0.7 0.6   CALCIUM 10.2 8.6 8.9     No results for input(s): AST, ALT, BILIDIR, BILITOT, ALKPHOS in the last 72 hours.   Recent Labs     06/26/20 2011   INR 0.97     No results for input(s):

## 2020-06-28 NOTE — ANESTHESIA PRE PROCEDURE
sodium chloride flush 0.9 % injection 10 mL  10 mL Intravenous 2 times per day Virgil Dominguez MD   10 mL at 06/27/20 2211    sodium chloride flush 0.9 % injection 10 mL  10 mL Intravenous PRN Virgil Dominguez MD        sennosides-docusate sodium (SENOKOT-S) 8.6-50 MG tablet 1 tablet  1 tablet Oral BID Virgil Dominguez MD   1 tablet at 06/27/20 2211    magnesium hydroxide (MILK OF MAGNESIA) 400 MG/5ML suspension 30 mL  30 mL Oral Daily PRN Virgil Dominguez MD        promethazine (PHENERGAN) tablet 12.5 mg  12.5 mg Oral Q6H PRN Virgil Dominguez MD        Or    ondansetron [facility]) injection 4 mg  4 mg Intravenous Q6H PRN Virgil Dominguez MD        HYDROmorphone (DILAUDID) injection 0.25 mg  0.25 mg Intravenous Q3H PRN Virgil Dominguez MD        Or    HYDROmorphone (DILAUDID) injection 0.5 mg  0.5 mg Intravenous Q3H PRN Virgil Dominguez MD        0.9 % sodium chloride infusion   Intravenous Continuous Trisha Negrete  mL/hr at 06/27/20 2353      ibuprofen (ADVIL;MOTRIN) tablet 600 mg  600 mg Oral Q6H Flori Edgar MD   600 mg at 06/27/20 2211    acetaminophen (TYLENOL) tablet 650 mg  650 mg Oral Q6H PRN Flori Edgar MD        Or   [facility] acetaminophen (TYLENOL) suppository 650 mg  650 mg Rectal Q6H PRN Flori Edgar MD        ceFAZolin (ANCEF) 2 g in dextrose 5 % 50 mL IVPB  2 g Intravenous On Call to Arlene Augustin MD        melatonin tablet 3 mg  3 mg Oral Nightly PRN Aníbal Adame MD   3 mg at 06/27/20 2353     Facility-Administered Medications Ordered in Other Encounters   Medication Dose Route Frequency Provider Last Rate Last Dose    ceFAZolin (ANCEF) injection    PRN Zuhair Curtis MD   2,000 mg at 06/28/20 0859    midazolam (VERSED) injection    PRN Zuhair Curtis MD   2 mg at 06/28/20 0845    fentaNYL (SUBLIMAZE) injection    PRN Zuhair Curtis MD   50 mcg at 06/28/20 0850    propofol injection    PRN Zuhair Curtis MD   200 mg at 06/28/20 0850    lidocaine CO2 21 06/28/2020    BUN 7 06/28/2020    CREATININE 0.6 06/28/2020    GFRAA >60 06/28/2020    LABGLOM >60 06/28/2020    GLUCOSE 101 06/28/2020    CALCIUM 8.9 06/28/2020       POC Tests: No results for input(s): POCGLU, POCNA, POCK, POCCL, POCBUN, POCHEMO, POCHCT in the last 72 hours. Coags:   Lab Results   Component Value Date    PROTIME 11.2 06/26/2020    INR 0.97 06/26/2020       HCG (If Applicable):   Lab Results   Component Value Date    PREGTESTUR Negative 06/27/2020        ABGs: No results found for: PHART, PO2ART, LPX7XCS, OGS7HTM, BEART, I2FVPSHO     Type & Screen (If Applicable):  No results found for: LABABO, LABRH    Drug/Infectious Status (If Applicable):  No results found for: HIV, HEPCAB    COVID-19 Screening (If Applicable):   Lab Results   Component Value Date    COVID19 Not Detected 06/27/2020         Anesthesia Evaluation    Airway: Mallampati: II  TM distance: >3 FB   Neck ROM: full  Mouth opening: > = 3 FB Dental:          Pulmonary:       (-) asthma and sleep apnea                           Cardiovascular:  Exercise tolerance: good (>4 METS),   (+) hypertension:,     (-)  angina and  KHAN                Neuro/Psych:      (-) seizures           GI/Hepatic/Renal:        (-) GERD       Endo/Other:        (-) diabetes mellitus               Abdominal:           Vascular:                                        Anesthesia Plan      general     ASA 2     (-npo MN  -denies any cardiac history, no chest pain or palp. No cough or fever  -has not previously had anesthesia)  Induction: intravenous. MIPS: Postoperative opioids intended. Anesthetic plan and risks discussed with patient.         Attending anesthesiologist reviewed and agrees with Pre Eval content              Rakan Bustillos MD   6/28/2020

## 2020-06-28 NOTE — PROGRESS NOTES
Pt re-admitted to [address] [place]. VSS. Pt denies pain medication. Ace wrap CD&I. Pt can wiggle toes, warm and pink.

## 2020-06-29 VITALS
OXYGEN SATURATION: 95 % | DIASTOLIC BLOOD PRESSURE: 78 MMHG | HEIGHT: 67 IN | WEIGHT: 185 LBS | BODY MASS INDEX: 29.03 KG/M2 | RESPIRATION RATE: 16 BRPM | HEART RATE: 72 BPM | TEMPERATURE: 98.4 F | SYSTOLIC BLOOD PRESSURE: 118 MMHG

## 2020-06-29 LAB
ANION GAP SERPL CALCULATED.3IONS-SCNC: 12 MMOL/L (ref 3–16)
BUN BLDV-MCNC: 6 MG/DL (ref 7–20)
CALCIUM SERPL-MCNC: 9.1 MG/DL (ref 8.3–10.6)
CHLORIDE BLD-SCNC: 104 MMOL/L (ref 99–110)
CO2: 21 MMOL/L (ref 21–32)
CREAT SERPL-MCNC: 0.5 MG/DL (ref 0.6–1.1)
GFR AFRICAN AMERICAN: >60
GFR NON-AFRICAN AMERICAN: >60
GLUCOSE BLD-MCNC: 148 MG/DL (ref 70–99)
HCT VFR BLD CALC: 37 % (ref 36–48)
HEMOGLOBIN: 12.9 G/DL (ref 12–16)
MCH RBC QN AUTO: 31.5 PG (ref 26–34)
MCHC RBC AUTO-ENTMCNC: 34.8 G/DL (ref 31–36)
MCV RBC AUTO: 90.5 FL (ref 80–100)
PDW BLD-RTO: 13.2 % (ref 12.4–15.4)
PLATELET # BLD: 227 K/UL (ref 135–450)
PMV BLD AUTO: 7.6 FL (ref 5–10.5)
POTASSIUM REFLEX MAGNESIUM: 3.8 MMOL/L (ref 3.5–5.1)
RBC # BLD: 4.09 M/UL (ref 4–5.2)
SODIUM BLD-SCNC: 137 MMOL/L (ref 136–145)
WBC # BLD: 9.3 K/UL (ref 4–11)

## 2020-06-29 PROCEDURE — 97535 SELF CARE MNGMENT TRAINING: CPT

## 2020-06-29 PROCEDURE — 97116 GAIT TRAINING THERAPY: CPT

## 2020-06-29 PROCEDURE — 2580000003 HC RX 258: Performed by: ORTHOPAEDIC SURGERY

## 2020-06-29 PROCEDURE — 85027 COMPLETE CBC AUTOMATED: CPT

## 2020-06-29 PROCEDURE — 97162 PT EVAL MOD COMPLEX 30 MIN: CPT

## 2020-06-29 PROCEDURE — 97166 OT EVAL MOD COMPLEX 45 MIN: CPT

## 2020-06-29 PROCEDURE — 6370000000 HC RX 637 (ALT 250 FOR IP): Performed by: ORTHOPAEDIC SURGERY

## 2020-06-29 PROCEDURE — 97530 THERAPEUTIC ACTIVITIES: CPT

## 2020-06-29 PROCEDURE — 80048 BASIC METABOLIC PNL TOTAL CA: CPT

## 2020-06-29 PROCEDURE — 6360000002 HC RX W HCPCS: Performed by: ORTHOPAEDIC SURGERY

## 2020-06-29 PROCEDURE — 36415 COLL VENOUS BLD VENIPUNCTURE: CPT

## 2020-06-29 RX ORDER — IBUPROFEN 600 MG/1
600 TABLET ORAL 3 TIMES DAILY PRN
Qty: 1 TABLET | Refills: 1 | Status: SHIPPED | OUTPATIENT
Start: 2020-06-29 | End: 2020-07-04

## 2020-06-29 RX ORDER — OXYCODONE HYDROCHLORIDE AND ACETAMINOPHEN 5; 325 MG/1; MG/1
1 TABLET ORAL EVERY 8 HOURS PRN
Qty: 15 TABLET | Refills: 0 | Status: SHIPPED | OUTPATIENT
Start: 2020-06-29 | End: 2020-07-04

## 2020-06-29 RX ADMIN — ASPIRIN 325 MG: 325 TABLET, COATED ORAL at 09:32

## 2020-06-29 RX ADMIN — DEXTROSE MONOHYDRATE 2 G: 50 INJECTION, SOLUTION INTRAVENOUS at 00:00

## 2020-06-29 RX ADMIN — DOCUSATE SODIUM 50 MG AND SENNOSIDES 8.6 MG 1 TABLET: 8.6; 5 TABLET, FILM COATED ORAL at 09:25

## 2020-06-29 RX ADMIN — ACETAMINOPHEN 650 MG: 325 TABLET ORAL at 12:11

## 2020-06-29 RX ADMIN — OXYCODONE 5 MG: 5 TABLET ORAL at 04:00

## 2020-06-29 RX ADMIN — Medication 10 ML: at 09:27

## 2020-06-29 ASSESSMENT — PAIN DESCRIPTION - LOCATION: LOCATION: ANKLE

## 2020-06-29 ASSESSMENT — PAIN DESCRIPTION - ORIENTATION: ORIENTATION: LEFT

## 2020-06-29 ASSESSMENT — PAIN SCALES - GENERAL
PAINLEVEL_OUTOF10: 1
PAINLEVEL_OUTOF10: 5
PAINLEVEL_OUTOF10: 3
PAINLEVEL_OUTOF10: 0

## 2020-06-29 ASSESSMENT — PAIN DESCRIPTION - PAIN TYPE: TYPE: SURGICAL PAIN

## 2020-06-29 NOTE — PROGRESS NOTES
Scheduled evening medications administered. Appears comfortable, denies pain at this time. Denies other needs at this time. Call light is in reach. Bed alarm is engaged.

## 2020-06-29 NOTE — OP NOTE
[facility]                [address]                                OPERATIVE REPORT    PATIENT NAME: Sloan Parsons                      :        1975  MED REC NO:   [id number]                          ROOM:       5526  ACCOUNT NO:   [de-identified]                           ADMIT DATE: 2020  PROVIDER:     Shaun Mosquera    DATE OF PROCEDURE:  2020    PREOPERATIVE DIAGNOSES:  1. Left ankle bimalleolar equivalent fracture with displaced Aj C  lateral malleolus fracture. 2.  Syndesmosis disruption, left ankle. POSTOPERATIVE DIAGNOSES:  1. Left ankle bimalleolar equivalent fracture with displaced Aj C  lateral malleolus fracture. 2.  Syndesmosis disruption, left ankle. OPERATIONS PERFORMED:  1. Open reduction internal fixation, left ankle lateral malleolus. 2.  Open reduction internal fixation, syndesmosis injury. SURGEON:  Dr. Ruthann Dhillon BLOOD LOSS:  Minimal.    COMPLICATIONS:  None immediately apparent. SPECIMENS:  None. INDICATIONS:  The patient is a 70-year-old, generally healthy female,  who had a mechanical fall, sustaining a left ankle fracture dislocation  with additional syndesmosis disruption. Risks, complications, and  benefits of operative versus nonoperative treatment were discussed with  the patient. She understands these and desires to proceed with surgery. OPERATIVE PROCEDURE:  The patient was identified and brought to the  operating room where she was placed on the operating room table in the  supine position. After satisfactory induction of general anesthesia, a  well-padded left thigh tourniquet was applied. The left lower extremity  was prepped and draped in the usual sterile fashion. Tourniquet was  inflated to 300 mmHg. Preoperatively, she received IV antibiotics. A  standard lateral approach to the distal fibula was utilized.   Sharp  dissection was

## 2020-06-29 NOTE — PROGRESS NOTES
General  Chart Reviewed: Yes  Additional Pertinent Hx: 38 y.o. F who sustainted a mechanical fall in which she slipped on wet grass while taking out the garbage on a slope today. Hospital Course: (+) ankle fracture dislocation with Aj C lateral malleolus fracture; OR 6/27 for   PMH:   Family / Caregiver Present: No  Referring Practitioner: Cheyenne Rodgers MD  Diagnosis: Closed L Ankle Fx  Subjective  Subjective: Pt in bed on entry. \"Great! I'm ready to get out of here. \" Pleasant and receptive to therapy. Pain Assessment  Pain Assessment: 0-10(no pain at rest, ankle pain when using rollabout only)  Social/Functional History  Social/Functional History  Lives With: Spouse(and two children)  Type of Home: House  Home Layout: Two level, Bed/Bath upstairs, 1/2 bath on main level  Home Access: Stairs to enter with rails  Bathroom Shower/Tub: Walk-in shower  Bathroom Toilet: Standard(one has adjacent vanity)  Bathroom Equipment: Shower chair  Home Equipment: Rolling walker  ADL Assistance: Independent  Homemaking Assistance: Independent  Ambulation Assistance: Independent  Transfer Assistance: Independent  Active : Yes  Occupation: Full time employment  Type of occupation: working from home since March  Additional Comments: going to parents' home the first week - one level with 1+1 to enter without rails, walk-in shower, shower chair.  had knee surgery last month, remains NWB. Objective   Vision: Within Functional Limits  Hearing: Within functional limits    Orientation  Overall Orientation Status: Within Functional Limits     Balance  Sitting Balance: Independent  Standing Balance: Supervision(static)  Standing Balance  Time: 1 min + 4 min + 4 min + 1 min + 1 min   Activity: functional mobility in room, bathroom for ADLs; trial of walker, crutches, rollabout; stair negotiation with PT  Comment: SBA for ambulation and transfers with walker.  CGA progressing to SBA for ambulation and transfers with rollabout. Min assist progressing to [id number] after cueing for use of crutches. Pt plans to use walker initially. Toilet Transfers  Toilet - Technique: Ambulating  Equipment Used: Standard toilet  Toilet Transfer: Stand by assistance  Toilet Transfers Comments: with grab bar  ADL  Feeding: Independent  Grooming: Independent  LE Dressing: Stand by assistance  Toileting: Stand by assistance  Additional Comments: Educated on sitting for ADL tasks, techniques for bathing, covering splint, using shower chair - verb understanding. Bed mobility  Supine to Sit: Modified independent  Transfers  Sit to stand: Stand by assistance  Stand to sit: Stand by assistance     Cognition  Overall Cognitive Status: [facility]        Plan  If pt discharges prior to next tx, this note will serve as d/c summary.  Continue per POC if pt does not d/c.     Plan  Times per week: 2-5x  Times per day: Daily  Current Treatment Recommendations: Balance Training, Functional Mobility Training, Self-Care / ADL, Equipment Evaluation, Education, & procurement, Patient/Caregiver Education & Training, Safety Education & Training    AM-PAC Score  AM-[facility] Inpatient Daily Activity Raw Score: 21 (06/29/20 1124)  AM-PAC Inpatient ADL T-Scale Score : 44.27 (06/29/20 1124)  ADL Inpatient CMS 0-100% Score: 32.79 (06/29/20 1124)  ADL Inpatient CMS G-Code Modifier : Aubrey Salas (06/29/20 1124)    Goals  Short term goals  Time Frame for Short term goals: by D/C  Short term goal 1: perform toileting and toilet transfer mod I with walker - Not met  Short term goal 2: perform functional mobility for item retrieval/ADLs spvn with walker - Not met  Patient Goals   Patient goals : to heal, be able to get around, go on vacation in August       Therapy Time   Individual Concurrent Group Co-treatment   Time In 1021         Time Out 1115         Minutes 54          Timed Code Tx Min: 39  Total Tx TIme: [address], [place], OT

## 2020-06-29 NOTE — DISCHARGE SUMMARY
Hospital Medicine Discharge Summary    Patient ID: Laurent Chester      Patient's PCP: No primary care provider on file. Admit Date: 6/26/2020     Discharge Date:   06/29/20    Admitting Physician: Lisa Cruz MD     Discharge Physician: Evens Small MD     Discharge Diagnoses: Active Hospital Problems    Diagnosis Date Noted    Closed left ankle fracture, initial encounter Sukh Rao 06/26/2020       The patient was seen and examined on day of discharge and this discharge summary is in conjunction with any daily progress note from day of discharge. Hospital Course:   Patient was admitted and treated for following    # L ankle fracture, traumatic  Ankle was splinted in the ED. Ortho was consulted. Analgesia PRN was given. Preop labs were done. Patient was started on IV maintenance fluid. Patient underwent open reduction and internal fixation of left ankle on 06/28/2020. Patient did well postoperatively. Patient was started on aspirin per orthopedic. Patient is being discharged in stable condition on Percocet/ibuprofen as needed and aspirin 325 twice daily per orthopedic instruction. Patient does not need Lovenox/heparin per orthopedic. Patient instructed to stop aspirin if okay per orthopedic surgery on follow-up in 10 to 14 days. Physical Exam Performed:     /78   Pulse 72   Temp 98.4 °F (36.9 °C) (Oral)   Resp 16   Ht 5' 7\" (1.702 m)   Wt 185 lb (83.9 kg)   SpO2 95%   BMI 28.98 kg/m²       General appearance:  No apparent distress, appears stated age and cooperative. HEENT:  Normal cephalic, atraumatic without obvious deformity. Pupils equal, round, and reactive to light. Extra ocular muscles intact. Conjunctivae/corneas clear. Neck: Supple, with full range of motion. No jugular venous distention. Trachea midline. Respiratory:  Normal respiratory effort. Clear to auscultation, bilaterally without Rales/Wheezes/Rhonchi.   Cardiovascular:  Regular rate and rhythm with normal S1/S2 without murmurs, rubs or gallops. Abdomen: Soft, non-tender, non-distended with normal bowel sounds. Musculoskeletal: Left ankle in dressing  Skin: Skin color, texture, turgor normal.  No rashes or lesions. Neurologic:  Neurovascularly intact without any focal sensory/motor deficits. Cranial nerves: II-XII intact, grossly non-focal.  Psychiatric:  Alert and oriented, thought content appropriate, normal insight  Capillary Refill: Brisk,< 3 seconds   Peripheral Pulses: +2 palpable, equal bilaterally       Labs: For convenience and continuity at follow-up the following most recent labs are provided:      CBC:    Lab Results   Component Value Date    WBC 9.3 06/29/2020    HGB 12.9 06/29/2020    HCT 37.0 06/29/2020     06/29/2020       Renal:    Lab Results   Component Value Date     06/29/2020    K 3.8 06/29/2020     06/29/2020    CO2 21 06/29/2020    BUN 6 06/29/2020    CREATININE 0.5 06/29/2020    CALCIUM 9.1 06/29/2020         Significant Diagnostic Studies    Radiology:   FLUORO FOR SURGICAL PROCEDURES   Final Result   See above      XR ANKLE LEFT (2 VIEWS)   Final Result   See above      XR TIBIA FIBULA LEFT (2 VIEWS)   Final Result   Impression:    Unstable ankle with medial subluxation of the tibia, disruption of the ankle mortise, and oblique distal fibular shaft fracture. XR FOOT LEFT (MIN 3 VIEWS)   Final Result   Impression:    No additional fracture in the foot. XR ANKLE LEFT (MIN 3 VIEWS)   Final Result   Impression:    Unstable ankle with medial subluxation of the tibia, disruption of the ankle mortise, and oblique distal fibular shaft fracture. Consults:     IP CONSULT TO ORTHOPEDIC SURGERY  IP CONSULT TO HOSPITALIST    Disposition: Home    Condition at Discharge: Stable    Discharge Instructions/Follow-up:  Aspirin started per orthopedic surgery.  Can stop taking if ok with orthopedic surgery  Follow up with orthopedic  Follow up with PCP    Code Status:  Full Code     Activity: activity as tolerated    Diet: regular diet      Discharge Medications:     Current Discharge Medication List           Details   aspirin 325 MG EC tablet Take 1 tablet by mouth 2 times daily Started per ortho. Can discontinue if ok with orthopedic  Qty: 30 tablet, Refills: 3      oxyCODONE-acetaminophen (PERCOCET) 5-325 MG per tablet Take 1 tablet by mouth every 8 hours as needed for Pain for up to 5 days. Intended supply: 3 days. Take lowest dose possible to manage pain  Qty: 15 tablet, Refills: 0    Comments: Reduce doses taken as pain becomes manageable  Associated Diagnoses: Fall, initial encounter; Closed fracture of left ankle, initial encounter; Closed left ankle fracture, initial encounter      ibuprofen (ADVIL;MOTRIN) 600 MG tablet Take 1 tablet by mouth 3 times daily as needed for Pain  Qty: 1 tablet, Refills: 1              Details   SUMAtriptan (IMITREX) 50 MG tablet Take 50 mg by mouth every 30 days Every month PRN for migraines             Time Spent on discharge is more than 20 minutes in the examination, evaluation, counseling and review of medications and discharge plan. Signed:    Camryn Bo MD   6/29/2020      Thank you No primary care provider on file. for the opportunity to be involved in this patient's care. If you have any questions or concerns please feel free to contact me at [phone].

## (undated) DEVICE — STANDARD HYPODERMIC NEEDLE,POLYPROPYLENE HUB: Brand: MONOJECT

## (undated) DEVICE — TOWEL,OR,DSP,ST,BLUE,DLX,8/PK,10PK/CS: Brand: MEDLINE

## (undated) DEVICE — STAPLER SKIN H3.9MM WIRE DIA0.58MM CRWN 6.9MM 35 STPL FIX

## (undated) DEVICE — 3M™ COBAN™ NL STERILE NON-LATEX SELF-ADHERENT WRAP, 2086S, 6 IN X 5 YD (15 CM X 4,5 M), 12 ROLLS/CASE: Brand: 3M™ COBAN™

## (undated) DEVICE — SUTURE VCRL UD BR CT 3-0 18IN CT1 J838D

## (undated) DEVICE — GOWN,SIRUS,POLYRNF,BRTHSLV,XL,30/CS: Brand: MEDLINE

## (undated) DEVICE — PADDING UNDERCAST W4INXL4YD 100% COT CRIMPED FINISH WBRL II

## (undated) DEVICE — PAD,NON-ADHERENT,3X8,STERILE,LF,1/PK: Brand: MEDLINE

## (undated) DEVICE — DRESSING,GAUZE,XEROFORM,CURAD,1"X8",ST: Brand: CURAD

## (undated) DEVICE — DRAPE 70X60IN SPLIT IMPERV ADHES STRIP

## (undated) DEVICE — DRAPE C ARM UNIV W41XL74IN CLR PLAS XR VELC CLSR POLY STRP

## (undated) DEVICE — ADHESIVE SKIN CLSR 0.7ML TOP DERMBND ADV

## (undated) DEVICE — DRESSING,GAUZE,XEROFORM,CURAD,5"X9",ST: Brand: CURAD

## (undated) DEVICE — SPLINT CAST 4INX15FT ORTHO GLS

## (undated) DEVICE — Z DISCONTINUED NO SUB IDED GLOVE SURG BEAD CUF 8 STD PF WHT STRL TRIUMPH LT LTX

## (undated) DEVICE — COVER LT HNDL BLU PLAS

## (undated) DEVICE — VELCLOSE LATEX FREE ELASTIC BANDAGE D/L 6INX10YD

## (undated) DEVICE — INTENDED FOR TISSUE SEPARATION, AND OTHER PROCEDURES THAT REQUIRE A SHARP SURGICAL BLADE TO PUNCTURE OR CUT.: Brand: BARD-PARKER ® CARBON RIB-BACK BLADES

## (undated) DEVICE — COAXIAL HIGH FLOW TIP WITH SOFT SHIELD

## (undated) DEVICE — GAUZE,SPONGE,4"X4",16PLY,XRAY,STRL,LF: Brand: MEDLINE

## (undated) DEVICE — COVER,MAYO STAND,XL,STERILE: Brand: MEDLINE

## (undated) DEVICE — COVER,TABLE,HEAVY DUTY,77"X90",STRL: Brand: MEDLINE

## (undated) DEVICE — AIR SHEET,LAT,COMFORT GLIDE, BLEND 40X80: Brand: MEDLINE

## (undated) DEVICE — PLATE ES AD W 9FT CRD 2

## (undated) DEVICE — SUTURE VCRL SZ 0 L18IN ABSRB UD L36MM CT-1 1/2 CIR J840D

## (undated) DEVICE — 3M™ STERI-DRAPE™ U-DRAPE 1015: Brand: STERI-DRAPE™

## (undated) DEVICE — COTTON UNDERCAST PADDING,CRIMPED FINISH: Brand: WEBRIL

## (undated) DEVICE — SUTURE STRATAFIX SPRL SZ 3-0 L5IN ABSRB UD FS L26MM 3/8 CIR SXMP2B411

## (undated) DEVICE — STOCKINETTE,DOUBLE PLY,4X48,STERILE: Brand: MEDLINE

## (undated) DEVICE — SUTURE VCRL SZ 2-0 L18IN ABSRB UD CT-1 L36MM 1/2 CIR J839D

## (undated) DEVICE — SOLUTION IV 1000ML 0.9% SOD CHL

## (undated) DEVICE — PODIATRY PK

## (undated) DEVICE — JEWISH HOSPITAL TURNOVER KIT: Brand: MEDLINE INDUSTRIES, INC.

## (undated) DEVICE — GOWN,SIRUS,POLYRNF,BRTHSLV,LG,30/CS: Brand: MEDLINE

## (undated) DEVICE — GLOVE ORTHO 8   MSG9480

## (undated) DEVICE — HANDPIECE SUCTION TUBING INTERPULSE 10FT

## (undated) DEVICE — APPLICATOR PREP 26ML 0.7% IOD POVACRYLEX 74% ISO ALC ST

## (undated) DEVICE — BANDAGE COBAN 4 IN COMPR W4INXL5YD FOAM COHESIVE QUIK STK SELF ADH SFT